# Patient Record
Sex: FEMALE | Race: WHITE | Employment: FULL TIME
[De-identification: names, ages, dates, MRNs, and addresses within clinical notes are randomized per-mention and may not be internally consistent; named-entity substitution may affect disease eponyms.]

---

## 2023-01-30 ENCOUNTER — OFFICE VISIT (OUTPATIENT)
Dept: INTERNAL MEDICINE CLINIC | Facility: CLINIC | Age: 38
End: 2023-01-30

## 2023-01-30 VITALS
OXYGEN SATURATION: 99 % | WEIGHT: 125 LBS | RESPIRATION RATE: 16 BRPM | HEART RATE: 60 BPM | DIASTOLIC BLOOD PRESSURE: 55 MMHG | HEIGHT: 64 IN | SYSTOLIC BLOOD PRESSURE: 114 MMHG | BODY MASS INDEX: 21.34 KG/M2 | TEMPERATURE: 97.3 F

## 2023-01-30 DIAGNOSIS — K58.2 IRRITABLE BOWEL SYNDROME WITH BOTH CONSTIPATION AND DIARRHEA: ICD-10-CM

## 2023-01-30 DIAGNOSIS — Z80.8 FAMILY HISTORY OF MELANOMA: Primary | ICD-10-CM

## 2023-01-30 RX ORDER — DICYCLOMINE HCL 20 MG
20 TABLET ORAL PRN
COMMUNITY

## 2023-01-30 RX ORDER — NITROFURANTOIN MACROCRYSTALS 25 MG/1
25 CAPSULE ORAL PRN
COMMUNITY

## 2023-01-30 RX ORDER — IBUPROFEN 800 MG/1
TABLET ORAL
COMMUNITY
Start: 2022-11-30

## 2023-01-30 ASSESSMENT — PATIENT HEALTH QUESTIONNAIRE - PHQ9
2. FEELING DOWN, DEPRESSED OR HOPELESS: 0
SUM OF ALL RESPONSES TO PHQ QUESTIONS 1-9: 0
SUM OF ALL RESPONSES TO PHQ QUESTIONS 1-9: 0
SUM OF ALL RESPONSES TO PHQ9 QUESTIONS 1 & 2: 0
SUM OF ALL RESPONSES TO PHQ QUESTIONS 1-9: 0
SUM OF ALL RESPONSES TO PHQ QUESTIONS 1-9: 0
1. LITTLE INTEREST OR PLEASURE IN DOING THINGS: 0

## 2023-01-30 NOTE — PROGRESS NOTES
v01/30/2023   Location:[facility] [address] [facility]  [place]  Patient #:  [id number]  YOB: 1985        History of Present Illness     Chief Complaint   Patient presents with    New Patient     Establishing care    Irritable Bowel Syndrome     Concern about diverticulitis as well had IBS for several years    Referral - General       Ms. Moses is a 42 y.o. female  who presents for visit to establish care. IBS  tries to manage with fiber and exercise  2 episodes 2020 may  has stomach pain and fever with vomiting. Has blood with constipation with hard stools. Small amounth on tissue. Constipation followed by diarrhea.   Has IBS flare around periods with diarrhea  Keeps regular follow up with gynecologist.    Last Labs  CBC:   Lab Results   Component Value Date/Time    WBC 3.3 02/01/2023 09:28 AM    RBC 3.97 02/01/2023 09:28 AM    HGB 10.1 02/01/2023 09:28 AM    HCT 32.4 02/01/2023 09:28 AM    MCV 81.6 02/01/2023 09:28 AM    MCH 25.4 02/01/2023 09:28 AM    MCHC 31.2 02/01/2023 09:28 AM    RDW 16.5 02/01/2023 09:28 AM     02/01/2023 09:28 AM    MPV 11.4 02/01/2023 09:28 AM     CMP:    Lab Results   Component Value Date/Time     02/01/2023 09:28 AM    K 4.8 02/01/2023 09:28 AM     02/01/2023 09:28 AM    CO2 25 02/01/2023 09:28 AM    BUN 14 02/01/2023 09:28 AM    CREATININE 0.70 02/01/2023 09:28 AM    LABGLOM >60 02/01/2023 09:28 AM    GLUCOSE 93 02/01/2023 09:28 AM    PROT 6.9 02/01/2023 09:28 AM    LABALBU 3.7 02/01/2023 09:28 AM    CALCIUM 9.1 02/01/2023 09:28 AM    BILITOT 0.3 02/01/2023 09:28 AM    ALKPHOS 42 02/01/2023 09:28 AM    AST 8 02/01/2023 09:28 AM    ALT 16 02/01/2023 09:28 AM     HgBA1c:  No results found for: LABA1C  FLP:    Lab Results   Component Value Date/Time    TRIG 98 02/01/2023 09:28 AM    HDL 73 02/01/2023 09:28 AM    LDLCALC 122.4 02/01/2023 09:28 AM    LABVLDL 19.6 02/01/2023 09:28 AM     TSH:  No results found for: TSH  VITAMIN B12: No components found for: B12    Allergies   Allergen Reactions    Pcn [Penicillins] Hives     Past Medical History:   Diagnosis Date    Asthma     as a child    GERD (gastroesophageal reflux disease)     IBS (irritable bowel syndrome)     Scoliosis     mild     Social History     Socioeconomic History    Marital status:      Spouse name: None    Number of children: None    Years of education: None    Highest education level: None   Tobacco Use    Smoking status: Never    Smokeless tobacco: Never   Vaping Use    Vaping Use: Never used   Substance and Sexual Activity    Alcohol use: Yes     Comment: social    Drug use: Not Currently     Past Surgical History:   Procedure Laterality Date    BREAST BIOPSY Left 2022    SKIN BIOPSY  2013    WISDOM TOOTH EXTRACTION       Family History   Problem Relation Age of Onset    Diabetes Mother     Colon Polyps Mother     Alcohol Abuse Father     High Blood Pressure Father     Asthma Brother     Cancer Maternal Grandmother     Colon Cancer Maternal Grandfather     Cancer Maternal Grandfather      Current Outpatient Medications   Medication Sig Dispense Refill    nitrofurantoin (MACRODANTIN) 25 MG capsule Take 25 mg by mouth as needed      ibuprofen (ADVIL;MOTRIN) 800 MG tablet       dicyclomine (BENTYL) 20 MG tablet Take 20 mg by mouth as needed       No current facility-administered medications for this visit.      Health Maintenance   Topic Date Due    Varicella vaccine (1 of 2 - 2-dose childhood series) Never done    Depression Screen  Never done    HIV screen  Never done    Hepatitis C screen  Never done    DTaP/Tdap/Td vaccine (1 - Tdap) Never done    COVID-19 Vaccine (3 - Booster for Pfizer series) 05/28/2021    Flu vaccine (1) Never done    Cervical cancer screen  09/01/2025    Hepatitis A vaccine  Aged Out    Hib vaccine  Aged Out    Meningococcal (ACWY) vaccine  Aged Out    Pneumococcal 0-64 years Vaccine  Aged Out             Review of Systems  Review of Systems   Constitutional:  Negative for fever. HENT:  Negative for trouble swallowing. Eyes:  Negative for visual disturbance. Respiratory:  Negative for cough and shortness of breath. Genitourinary:  Negative for difficulty urinating. Musculoskeletal:  Negative for arthralgias. Neurological:  Negative for headaches. All other systems reviewed and are negative. BP (!) 114/55 (Site: Left Upper Arm, Position: Sitting)   Pulse 60   Temp 97.3 °F (36.3 °C) (Temporal)   Resp 16   Ht 5' 3.5\" (1.613 m)   Wt 125 lb (56.7 kg)   LMP 01/16/2023   SpO2 99%   BMI 21.80 kg/m²     Wt Readings from Last 3 Encounters:   01/30/23 125 lb (56.7 kg)       Physical Exam    Physical Exam  Vitals and nursing note reviewed. Constitutional:       Appearance: Normal appearance. HENT:      Head: Normocephalic and atraumatic. Right Ear: Tympanic membrane normal.      Left Ear: Tympanic membrane normal.   Cardiovascular:      Rate and Rhythm: Normal rate and regular rhythm. Pulmonary:      Effort: Pulmonary effort is normal.      Breath sounds: Normal breath sounds. Abdominal:      General: Bowel sounds are normal.      Palpations: Abdomen is soft. Neurological:      Mental Status: She is alert. Assessment & Plan    Encounter Diagnoses   Name Primary? Family history of melanoma Yes    Irritable bowel syndrome with both constipation and diarrhea        No orders of the defined types were placed in this encounter. Orders Placed This Encounter   Procedures    External Referral to Dermatology     Referral Priority:   Routine     Referral Type:   Eval and Treat     Referral Reason:   Specialty Services Required     Requested Specialty:   Dermatology     Number of Visits Requested:   1       Discussed the importance of regular exercise and a healthy diet. Reviewed recommended screenings.     Return in about 1 year (around 1/30/2024) for yearly evaluation, and as needed for new or worsening problems.        Agustin Denny MD     Hematemesis

## 2023-02-01 ENCOUNTER — NURSE ONLY (OUTPATIENT)
Dept: INTERNAL MEDICINE CLINIC | Facility: CLINIC | Age: 38
End: 2023-02-01

## 2023-02-01 DIAGNOSIS — Z00.00 ROUTINE GENERAL MEDICAL EXAMINATION AT A HEALTH CARE FACILITY: Primary | ICD-10-CM

## 2023-02-01 LAB
ALBUMIN SERPL-MCNC: 3.7 G/DL (ref 3.5–5)
ALBUMIN/GLOB SERPL: 1.2 (ref 0.4–1.6)
ALP SERPL-CCNC: 42 U/L (ref 50–136)
ALT SERPL-CCNC: 16 U/L (ref 12–65)
ANION GAP SERPL CALC-SCNC: 7 MMOL/L (ref 2–11)
AST SERPL-CCNC: 8 U/L (ref 15–37)
BASOPHILS # BLD: 0.1 K/UL (ref 0–0.2)
BASOPHILS NFR BLD: 2 % (ref 0–2)
BILIRUB SERPL-MCNC: 0.3 MG/DL (ref 0.2–1.1)
BUN SERPL-MCNC: 14 MG/DL (ref 6–23)
CALCIUM SERPL-MCNC: 9.1 MG/DL (ref 8.3–10.4)
CHLORIDE SERPL-SCNC: 110 MMOL/L (ref 101–110)
CHOLEST SERPL-MCNC: 215 MG/DL
CO2 SERPL-SCNC: 25 MMOL/L (ref 21–32)
CREAT SERPL-MCNC: 0.7 MG/DL (ref 0.6–1)
DIFFERENTIAL METHOD BLD: ABNORMAL
EOSINOPHIL # BLD: 0.1 K/UL (ref 0–0.8)
EOSINOPHIL NFR BLD: 2 % (ref 0.5–7.8)
ERYTHROCYTE [DISTWIDTH] IN BLOOD BY AUTOMATED COUNT: 16.5 % (ref 11.9–14.6)
GLOBULIN SER CALC-MCNC: 3.2 G/DL (ref 2.8–4.5)
GLUCOSE SERPL-MCNC: 93 MG/DL (ref 65–100)
HCT VFR BLD AUTO: 32.4 % (ref 35.8–46.3)
HDLC SERPL-MCNC: 73 MG/DL (ref 40–60)
HDLC SERPL: 2.9
HGB BLD-MCNC: 10.1 G/DL (ref 11.7–15.4)
IMM GRANULOCYTES # BLD AUTO: 0 K/UL (ref 0–0.5)
IMM GRANULOCYTES NFR BLD AUTO: 0 % (ref 0–5)
LDLC SERPL CALC-MCNC: 122.4 MG/DL
LYMPHOCYTES # BLD: 1.5 K/UL (ref 0.5–4.6)
LYMPHOCYTES NFR BLD: 45 % (ref 13–44)
MCH RBC QN AUTO: 25.4 PG (ref 26.1–32.9)
MCHC RBC AUTO-ENTMCNC: 31.2 G/DL (ref 31.4–35)
MCV RBC AUTO: 81.6 FL (ref 82–102)
MONOCYTES # BLD: 0.3 K/UL (ref 0.1–1.3)
MONOCYTES NFR BLD: 9 % (ref 4–12)
NEUTS SEG # BLD: 1.4 K/UL (ref 1.7–8.2)
NEUTS SEG NFR BLD: 42 % (ref 43–78)
NRBC # BLD: 0 K/UL (ref 0–0.2)
PLATELET # BLD AUTO: 270 K/UL (ref 150–450)
PMV BLD AUTO: 11.4 FL (ref 9.4–12.3)
POTASSIUM SERPL-SCNC: 4.8 MMOL/L (ref 3.5–5.1)
PROT SERPL-MCNC: 6.9 G/DL (ref 6.3–8.2)
RBC # BLD AUTO: 3.97 M/UL (ref 4.05–5.2)
SODIUM SERPL-SCNC: 142 MMOL/L (ref 133–143)
TRIGL SERPL-MCNC: 98 MG/DL (ref 35–150)
TSH W FREE THYROID IF ABNORMAL: 1.49 UIU/ML (ref 0.36–3.74)
VLDLC SERPL CALC-MCNC: 19.6 MG/DL (ref 6–23)
WBC # BLD AUTO: 3.3 K/UL (ref 4.3–11.1)

## 2023-02-10 ENCOUNTER — TELEPHONE (OUTPATIENT)
Dept: INTERNAL MEDICINE CLINIC | Facility: CLINIC | Age: 38
End: 2023-02-10

## 2023-02-10 ASSESSMENT — ENCOUNTER SYMPTOMS
TROUBLE SWALLOWING: 0
COUGH: 0
SHORTNESS OF BREATH: 0

## 2023-02-10 NOTE — TELEPHONE ENCOUNTER
Please call and notify patient:  Recent labs were reviewed.  Glucose/Blood sugar was normal.  Kidney function, liver function and thyroid function  were normal.  Cholesterol was at goal.  Pearle Lennox, MD